# Patient Record
Sex: FEMALE | Race: BLACK OR AFRICAN AMERICAN | NOT HISPANIC OR LATINO | Employment: OTHER
[De-identification: names, ages, dates, MRNs, and addresses within clinical notes are randomized per-mention and may not be internally consistent; named-entity substitution may affect disease eponyms.]

---

## 2024-05-09 ENCOUNTER — TRANSCRIBE ORDERS (OUTPATIENT)
Dept: OTHER | Age: 69
End: 2024-05-09

## 2024-05-09 DIAGNOSIS — H20.9 IRITIS: Primary | ICD-10-CM

## 2024-06-26 ENCOUNTER — OFFICE VISIT (OUTPATIENT)
Dept: OPHTHALMOLOGY | Facility: CLINIC | Age: 69
End: 2024-06-26
Attending: OPHTHALMOLOGY
Payer: COMMERCIAL

## 2024-06-26 DIAGNOSIS — H35.353 CYSTOID MACULAR EDEMA OF BOTH EYES: ICD-10-CM

## 2024-06-26 DIAGNOSIS — H35.042 RETINAL MICRO-ANEURYSM OF LEFT EYE: ICD-10-CM

## 2024-06-26 DIAGNOSIS — H20.13 CHRONIC IRITIS OF BOTH EYES: Primary | ICD-10-CM

## 2024-06-26 PROBLEM — E11.42 DM TYPE 2 WITH DIABETIC PERIPHERAL NEUROPATHY (H): Status: ACTIVE | Noted: 2019-07-05

## 2024-06-26 PROBLEM — Z96.651 CHRONIC KNEE PAIN AFTER TOTAL REPLACEMENT OF RIGHT KNEE JOINT: Status: ACTIVE | Noted: 2018-12-20

## 2024-06-26 PROBLEM — E78.5 HYPERLIPIDEMIA: Status: ACTIVE | Noted: 2022-02-15

## 2024-06-26 PROBLEM — M25.561 CHRONIC KNEE PAIN AFTER TOTAL REPLACEMENT OF RIGHT KNEE JOINT: Status: ACTIVE | Noted: 2018-12-20

## 2024-06-26 PROBLEM — G89.29 CHRONIC KNEE PAIN AFTER TOTAL REPLACEMENT OF RIGHT KNEE JOINT: Status: ACTIVE | Noted: 2018-12-20

## 2024-06-26 PROBLEM — Z96.651 STATUS POST TOTAL RIGHT KNEE REPLACEMENT: Status: ACTIVE | Noted: 2017-05-02

## 2024-06-26 PROBLEM — N18.31 STAGE 3A CHRONIC KIDNEY DISEASE (H): Status: ACTIVE | Noted: 2023-11-10

## 2024-06-26 PROCEDURE — 99203 OFFICE O/P NEW LOW 30 MIN: CPT | Performed by: OPHTHALMOLOGY

## 2024-06-26 PROCEDURE — G0463 HOSPITAL OUTPT CLINIC VISIT: HCPCS | Performed by: OPHTHALMOLOGY

## 2024-06-26 RX ORDER — BLOOD SUGAR DIAGNOSTIC
STRIP MISCELLANEOUS
COMMUNITY
Start: 2024-03-19

## 2024-06-26 RX ORDER — PEN NEEDLE, DIABETIC 31 GX5/16"
NEEDLE, DISPOSABLE MISCELLANEOUS
COMMUNITY
Start: 2024-02-26

## 2024-06-26 RX ORDER — DIFLUPREDNATE OPHTHALMIC 0.5 MG/ML
1 EMULSION OPHTHALMIC 4 TIMES DAILY
COMMUNITY

## 2024-06-26 RX ORDER — LISINOPRIL 10 MG/1
10 TABLET ORAL 2 TIMES DAILY
COMMUNITY
Start: 2023-11-12

## 2024-06-26 RX ORDER — INSULIN GLARGINE 100 [IU]/ML
40 INJECTION, SOLUTION SUBCUTANEOUS
COMMUNITY
Start: 2023-08-02

## 2024-06-26 RX ORDER — BROMFENAC SODIUM 0.7 MG/ML
1 SOLUTION/ DROPS OPHTHALMIC DAILY
COMMUNITY

## 2024-06-26 ASSESSMENT — CUP TO DISC RATIO
OD_RATIO: 0.4
OS_RATIO: 0.4

## 2024-06-26 ASSESSMENT — EXTERNAL EXAM - LEFT EYE: OS_EXAM: NORMAL

## 2024-06-26 ASSESSMENT — VISUAL ACUITY
OS_SC+: -1
OD_PH_SC: 20/60
OS_PH_SC: 20/30
OD_PH_SC+: +1
OD_SC: 20/70
OD_SC+: -1
OS_SC: 20/60
METHOD: SNELLEN - LINEAR

## 2024-06-26 ASSESSMENT — TONOMETRY
IOP_METHOD: ICARE
OD_IOP_MMHG: 12
OS_IOP_MMHG: 12

## 2024-06-26 ASSESSMENT — SLIT LAMP EXAM - LIDS
COMMENTS: NORMAL
COMMENTS: NORMAL

## 2024-06-26 ASSESSMENT — EXTERNAL EXAM - RIGHT EYE: OD_EXAM: NORMAL

## 2024-06-26 NOTE — LETTER
6/26/2024       RE: Donald Moon  [address]  [facility] [address]     Dear Colleague,    Thank you for referring your patient, Donald Moon, to the [facility] [place] at [facility]. Please see a copy of my visit note below.    Chief Complaint/Presenting Concern: Uveitis evaluation.     History of Present Illness:   Donald Moon is a 69 year old patient who presents for evaluation of chronic anterior uveitis from Dr. Wade at [facility].    The uveitis has been present since at least 2021 and was noted after cataract surgeries and has been treated with a variety of steroid drops.  Macular edema has been present for a few years and has also been treated with topical nonsteroidal drops.    Ms. Donald Moon was evaluated by Dr. Kajal Snell, Uveitis Specialist at [facility] a few weeks ago. Laboratory evaluation was recommended and completed and steroid drops were switched to Durezol.    Today Donald Moon reports things are doing well on the current treatments although vision is still blurry particularly at close.    Additional Ocular History:   Cataract surgery with lens implant in each eye  Diabetic retinopathy  Cystoid macular edema    Relevant Past Medical/Family/Social History:  Diabetes mellitus  Exposure to someone with TB twice but prior tests negative.     Relevant Review of Systems:  Intermittent right knee pain even after knee replacement  Some sensitivity to contact on skin  Some GI issues recommended to be evaluated by Dr. Snell    Labs: 6/20223: Normal/negative; Treponema, Quantiferon, HLA B27, Lysozyme,ACE low     Chest X-ray 6/13/24: Normal lungs and heart    Current eye related medications: Bromfenac once daily in each eye, Durezol 4x/day in each eye     Retina/Uveitis Imaging: None today    Assessment:    1. Chronic iritis of both eyes  NO cell today in either eye    2. 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  There is no active inflammation in either eye and the difluprednate drops are well-tolerated along with bromfenac.  Recent OCT scans show persistent macular edema which is apparent clinically but we did not repeat an OCT scan today.  As below we will recommend tapering Durezol while continuing Bromfenac.  Eye pressure is normal in each eye even on Durezol. No pressure lowering drops are needed.   For the pink top (Durezol), reduce to 3x/day in each eye. If still doing well next time at [facility], your Doctors may reduce this to 2x/day in each eye.   Continue Bromfenac once daily in each eye.   No other treatment or blood tests recommended given negative workup to date.  Reasonable to proceed with stool tests per Dr. Snell.  At this point the cause of the inflammation seems most likely to be related to post cataract surgery inflammation.     RTC July 11 at [facility] late August-early September. Refract, Tonopen, no dilation but with OCT (ordered)    Physician Attestation    Attending Physician Attestation:  Complete documentation of historical and exam elements from today's encounter can be found in the full encounter summary report (not reduplicated in this progress note). I personally obtained the chief complaint(s) and history of present illness. I confirmed and edited as necessary the review of systems, past medical/surgical history, family history, social history, and examination findings as documented by others; and I examined the patient myself. I personally reviewed the relevant tests, images, and reports as documented above. I formulated and edited as necessary the assessment and plan and discussed the findings and management plan with the patient and any family members  present at the time of the visit.  Frankie Gunn M.D., Uveitis and Medical Retina, June 26, 2024       Again, thank you for allowing me to participate in the care of your patient.      Sincerely,    Frankie Gunn MD  [facility] Dept of Ophthalmology  Uveitis and Medical Retina

## 2024-06-26 NOTE — PROGRESS NOTES
Chief Complaint/Presenting Concern: Uveitis evaluation     History of Present Illness:   Donald Moon is a 69 year old patient who presents for evaluation of chronic anterior uveitis from Dr. Wade at [facility].    The uveitis has been present since at least 2021 and was noted after cataract surgeries and has been treated with a variety of steroid drops.  Macular edema has been present for a few years and has also been treated with topical nonsteroidal drops.    Ms. Donald Moon was evaluated by Dr. Kajal Snell, Uveitis Specialist at [facility] a few weeks ago.  Laboratory evaluation was recommended and completed and steroid drops were switched to Durezol.    Today Donald Moon reports things are doing well on the current treatments although vision is still blurry particularly at close    Additional Ocular History:   Cataract surgery with lens implant in each eye  Diabetic retinopathy  Cystoid macular edema    Relevant Past Medical/Family/Social History:  Diabetes mellitus  Exposure to someone with TB twice but prior tests negative.     Relevant Review of Systems:  Intermittent right knee pain even after knee replacement  Some sensitivity to contact on skin  Some GI issues recommended to be evaluated by Dr. Snell    Labs: 6/20223: Normal/negative; Treponema, Quantiferon, HLA B27, Lysozyme,ACE low     Chest X-ray 6/13/24: Normal lungs and heart    Current eye related medications: Bromfenac once daily in each eye, Durezol 4x/day in each eye     Retina/Uveitis Imaging: None today    Assessment:    1. Chronic iritis of both eyes  NO cell today in either eye    2. Cystoid macular edema of both eyes  Present clinically, no OCT done today as one done recently at [facility]    3. 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  There is no active inflammation in either eye and the difluprednate drops are well-tolerated  along with bromfenac.  Recent OCT scans show persistent macular edema which is apparent clinically but we did not repeat an OCT scan today.  As below we will recommend tapering Durezol while continuing bromfenac  Eye pressure is normal in each eye even on Durezol. No pressure lowering drops are needed   For the pink top (Durezol), reduce to 3x/day in each eye. If still doing well next time at [facility], your Doctors may reduce this to 2x/day in each eye   Continue Bromfenac once daily in each eye   No other treatment or blood tests recommended given negative workup to date.  Reasonable to proceed with stool tests per Dr. Snell.  At this point the cause of the inflammation seems most likely to be related to post cataract surgery inflammation.     RTC July 11 at [facility] late August-early September. Refract, Tonopen, no dilation but with OCT (ordered)    Physician Attestation     Attending Physician Attestation:  Complete documentation of historical and exam elements from today's encounter can be found in the full encounter summary report (not reduplicated in this progress note). I personally obtained the chief complaint(s) and history of present illness. I confirmed and edited as necessary the review of systems, past medical/surgical history, family history, social history, and examination findings as documented by others; and I examined the patient myself. I personally reviewed the relevant tests, images, and reports as documented above. I formulated and edited as necessary the assessment and plan and discussed the findings and management plan with the patient and any family members present at the time of the visit.  Frankie Gunn M.D., Uveitis and Medical Retina, June 26, 2024

## 2024-06-26 NOTE — NURSING NOTE
Chief Complaints and History of Present Illnesses   Patient presents with    Uveitis Evaluation     Chief Complaint(s) and History of Present Illness(es)       Uveitis Evaluation              Laterality: both eyes    Onset: gradual    Onset: months ago    Severity: moderate    Frequency: intermittently    Pain scale: 0/10              Comments    Here for Anterior uveitis each eye   Pt stated that the right eye> left eye has been having a lot of irritation. She was prescribed gtts and they have been improving symptoms such as the redness but discomfort is still present. Vision is decreasing and becoming blurry, she is having to squint more to see. Pt stated she possibly had an episode of flashes last evening but unsure if it was something else.     Treatment:   Bromfenac each eye qday   Difluprednate each eye  QID     DM 2     Last BS: 83 this morning   A1C unsure    Tessa Gomez 1:09 PM  June 26, 2024

## 2024-06-26 NOTE — PATIENT INSTRUCTIONS
For the pink top (Durezol), reduce to 3x/day in each eye. If still doing well next time at [facility], your Doctors may reduce this to 2x/day in each eye   Continue Bromfenac once daily in each eye   No other treatment or blood tests recommended

## 2025-02-05 ENCOUNTER — OFFICE VISIT (OUTPATIENT)
Dept: OPHTHALMOLOGY | Facility: CLINIC | Age: 70
End: 2025-02-05
Attending: OPHTHALMOLOGY
Payer: COMMERCIAL

## 2025-02-05 DIAGNOSIS — H35.353 CYSTOID MACULAR EDEMA OF BOTH EYES: ICD-10-CM

## 2025-02-05 DIAGNOSIS — H20.13 CHRONIC IRITIS OF BOTH EYES: Primary | ICD-10-CM

## 2025-02-05 PROCEDURE — G0463 HOSPITAL OUTPT CLINIC VISIT: HCPCS | Performed by: OPHTHALMOLOGY

## 2025-02-05 RX ORDER — PREDNISOLONE ACETATE 10 MG/ML
1 SUSPENSION/ DROPS OPHTHALMIC 4 TIMES DAILY
COMMUNITY
Start: 2024-12-07

## 2025-02-05 RX ORDER — DIFLUPREDNATE OPHTHALMIC 0.5 MG/ML
1 EMULSION OPHTHALMIC 2 TIMES DAILY
Qty: 5 ML | Refills: 3 | Status: SHIPPED | OUTPATIENT
Start: 2025-02-05

## 2025-02-05 RX ORDER — BROMFENAC SODIUM 0.7 MG/ML
1 SOLUTION/ DROPS OPHTHALMIC DAILY
Qty: 6 ML | Refills: 3 | Status: SHIPPED | OUTPATIENT
Start: 2025-02-05

## 2025-02-05 ASSESSMENT — VISUAL ACUITY
OD_SC: 20/100
METHOD: SNELLEN - LINEAR
OS_SC+: -1
OD_PH_SC+: -2
OD_PH_SC: 20/70
OS_PH_SC: 20/30
OS_SC: 20/60
OS_PH_SC+: -2
OD_SC+: -1

## 2025-02-05 ASSESSMENT — EXTERNAL EXAM - LEFT EYE: OS_EXAM: NORMAL

## 2025-02-05 ASSESSMENT — CONF VISUAL FIELD
OD_SUPERIOR_NASAL_RESTRICTION: 0
METHOD: COUNTING FINGERS
OD_SUPERIOR_TEMPORAL_RESTRICTION: 0
OS_INFERIOR_TEMPORAL_RESTRICTION: 0
OD_INFERIOR_NASAL_RESTRICTION: 0
OS_SUPERIOR_NASAL_RESTRICTION: 0
OD_INFERIOR_TEMPORAL_RESTRICTION: 0
OS_NORMAL: 1
OS_INFERIOR_NASAL_RESTRICTION: 0
OD_NORMAL: 1
OS_SUPERIOR_TEMPORAL_RESTRICTION: 0

## 2025-02-05 ASSESSMENT — TONOMETRY
IOP_METHOD: TONOPEN
OS_IOP_MMHG: 15
OD_IOP_MMHG: 19

## 2025-02-05 ASSESSMENT — EXTERNAL EXAM - RIGHT EYE: OD_EXAM: NORMAL

## 2025-02-05 ASSESSMENT — SLIT LAMP EXAM - LIDS
COMMENTS: NORMAL
COMMENTS: NORMAL

## 2025-02-05 ASSESSMENT — CUP TO DISC RATIO
OD_RATIO: 0.4
OS_RATIO: 0.4

## 2025-02-05 NOTE — PROGRESS NOTES
Chief Complaint/Presenting Concern:  Uveitis follow up    Interval History of Present Ocular Illness:  Donald Moon is a 70 year old patient who returns for follow up of her iritis. Last visit there was no active inflammation and we recommended tapering drops.    Ms. Moon had been doing well for a few months on Durezol and then this was not refilled, so she was switched to prednisolone which helped .     In January 2025, the eye redness started  up again after getting the injections for diabetes. This got a little better until she woke up with eye redness today.     Interval Updates to Medical/Family/Social History:  Health is well    Relevant Review of Systems Updates:  Feeling well     Current eye related medications: Prednisolone 4x/day each eye, no Bromfenac    No Imaging today    Assessment:     1. Chronic iritis of both eyes (Primary)  Active today    2. Cystoid macular edema of both eyes  Without exudates    Plan/Recommendations:    Discussed findings with patient. The iritis flared today and seems to possibly have flared also after injections of Avastin in January 2025. It may have to do with these injections and/or the introduction of something different and changing steroid drop doses. WE discussed that it may be best to keep the same regimen for a while to reduce likelihood of flares and we will modify as below  Eye pressure is normal in each eye. We should monitor without pressure lowering drops now  Recommend additional testing: None needed  Restart non-steroidal eye drop (Bromfenac) once daily in each eye  For the pink top steroid drop, we sent a prescription for a stronger one called Difluprednate (Durezol). If this is available, please use this one 2x/day in each eye as this is better  and stronger. If this not available, please use the current pink top (Prednisolone) 6x/day today and 2 more days, then back to 4x/day in each eye     RTC WE will reach out to Dr. Wade about a follow up  with him at [facility] or here at [facility] in 2-3 weeks    Physician Attestation     Attending Physician Attestation:  Complete documentation of historical and exam elements from today's encounter can be found in the full encounter summary report (not reduplicated in this progress note). I personally obtained the chief complaint(s) and history of present illness. I confirmed and edited as necessary the review of systems, past medical/surgical history, family history, social history, and examination findings as documented by others; and I examined the patient myself. I personally reviewed the relevant tests, images, and reports as documented above. I formulated and edited as necessary the assessment and plan and discussed the findings and management plan with the patient and family members present at the time of this visit.  Frankie Gunn M.D., Uveitis and Medical Retina, February 5, 2025

## 2025-02-05 NOTE — NURSING NOTE
Chief Complaints and History of Present Illnesses   Patient presents with    Uveitis Follow-Up     Chief Complaint(s) and History of Present Illness(es)       Uveitis Follow-Up              Laterality: both eyes    Associated symptoms: redness.  Negative for eye pain    Pain scale: 0/10              Comments    Donald is here to continue care for chronic uveitis of both eyes. Today she states eyes are red and she has a headache on the right side of her head. She stated pred on 12-7 4 times daily both eyes after a visit at [facility].     Germain Cook COT 2:04 PM February 5, 2025

## 2025-02-05 NOTE — Clinical Note
Bridger Cao: Unplanned visit today. I think regular Durezol might be best. She would like to come with you at [facility] if possible. Thanks! Can see back anytime

## 2025-02-05 NOTE — LETTER
2/5/2025       RE: Donald Moon  [address]  [place] [address]     Dear Colleague,    Thank you for referring your patient, Donald Moon, to the [facility] [place] at [facility]. Please see a copy of my visit note below.    Chief Complaint/Presenting Concern:  Uveitis follow up    Interval History of Present Ocular Illness:  Donald Moon is a 70 year old patient who returns for follow up of her iritis. Last visit there was no active inflammation and we recommended tapering drops.    Ms. Moon had been doing well for a few months on Durezol and then this was not refilled, so she was switched to prednisolone which helped .     In January 2025, the eye redness started  up again after getting the injections for diabetes. This got a little better until she woke up with eye redness today.     Interval Updates to Medical/Family/Social History:  Health is well    Relevant Review of Systems Updates:  Feeling well     Current eye related medications: Prednisolone 4x/day each eye, no Bromfenac    No Imaging today    Assessment:     1. Chronic iritis of both eyes (Primary)  Active today    2. Cystoid macular edema of both eyes  Without exudates    Plan/Recommendations:    Discussed findings with patient. The iritis flared today and seems to possibly have flared also after injections of Avastin in January 2025. It may have to do with these injections and/or the introduction of something different and changing steroid drop doses. We discussed that it may be best to keep the same regimen for a while to reduce likelihood of flares and we will modify as below  Eye pressure is normal in each eye. We should monitor without pressure lowering drops now  Recommend additional testing: None needed  Restart non-steroidal eye drop (Bromfenac) once daily in each eye  For the pink top steroid drop, we sent a prescription for a stronger one called  Difluprednate (Durezol). If this is available, please use this one 2x/day in each eye as this is better  and stronger. If this not available, please use the current pink top (Prednisolone) 6x/day today and 2 more days, then back to 4x/day in each eye     RTC WE will reach out to Dr. Wade about a follow up with him at [facility] or here at [facility] in 2-3 weeks    Physician Attestation    Attending Physician Attestation:  Complete documentation of historical and exam elements from today's encounter can be found in the full encounter summary report (not reduplicated in this progress note). I personally obtained the chief complaint(s) and history of present illness. I confirmed and edited as necessary the review of systems, past medical/surgical history, family history, social history, and examination findings as documented by others; and I examined the patient myself. I personally reviewed the relevant tests, images, and reports as documented above. I formulated and edited as necessary the assessment and plan and discussed the findings and management plan with the patient and family members present at the time of this visit.  Frankie Gunn M.D., Uveitis and Medical Retina, February 5, 2025     Again, thank you for allowing me to participate in the care of your patient.      Sincerely,    Frankie Gunn MD  Uveitis and Medical Retina    Department of Ophthalmology & Visual Neurosciences  [facility]

## 2025-02-05 NOTE — PATIENT INSTRUCTIONS
Recommend additional testing: None needed  Restart non-steroidal eye drop (Bromfenac) once daily in each eye  For the pink top steroid drop, we sent a prescription for a stronger one called Difluprednate (Durezol). If this is available, please use this one 2x/day in each eye as this is better  and stronger. If this not available, please use the current pink top (Prednisolone) 6x/day today and 2 more days, then back to 4x/day in each eye   We will reach out to Dr. Wade about a follow up with him at [facility] or here at [facility] in 2-3 weeks